# Patient Record
Sex: MALE | Race: WHITE
[De-identification: names, ages, dates, MRNs, and addresses within clinical notes are randomized per-mention and may not be internally consistent; named-entity substitution may affect disease eponyms.]

---

## 2018-10-14 ENCOUNTER — HOSPITAL ENCOUNTER (EMERGENCY)
Dept: HOSPITAL 53 - M ED | Age: 73
Discharge: HOME | End: 2018-10-14
Payer: COMMERCIAL

## 2018-10-14 DIAGNOSIS — M17.11: Primary | ICD-10-CM

## 2018-10-14 DIAGNOSIS — I10: ICD-10-CM

## 2018-10-14 DIAGNOSIS — Z79.899: ICD-10-CM

## 2018-10-14 DIAGNOSIS — Z79.82: ICD-10-CM

## 2018-10-14 DIAGNOSIS — E78.00: ICD-10-CM

## 2018-10-14 PROCEDURE — 73564 X-RAY EXAM KNEE 4 OR MORE: CPT

## 2020-05-29 ENCOUNTER — HOSPITAL ENCOUNTER (EMERGENCY)
Dept: HOSPITAL 53 - M ED | Age: 75
Discharge: HOME | End: 2020-05-29
Payer: COMMERCIAL

## 2020-05-29 VITALS — WEIGHT: 205.43 LBS | HEIGHT: 72 IN | BODY MASS INDEX: 27.82 KG/M2

## 2020-05-29 VITALS — SYSTOLIC BLOOD PRESSURE: 131 MMHG | DIASTOLIC BLOOD PRESSURE: 75 MMHG

## 2020-05-29 DIAGNOSIS — E78.5: ICD-10-CM

## 2020-05-29 DIAGNOSIS — K40.90: Primary | ICD-10-CM

## 2020-05-29 DIAGNOSIS — Z87.891: ICD-10-CM

## 2020-05-29 DIAGNOSIS — Z79.82: ICD-10-CM

## 2020-05-29 DIAGNOSIS — I10: ICD-10-CM

## 2020-05-29 DIAGNOSIS — Z79.899: ICD-10-CM

## 2020-05-29 LAB
ALBUMIN SERPL BCG-MCNC: 3.9 GM/DL (ref 3.2–5.2)
ALT SERPL W P-5'-P-CCNC: 37 U/L (ref 12–78)
APTT BLD: 23.7 SECONDS (ref 25–38.4)
BASOPHILS # BLD AUTO: 0 10^3/UL (ref 0–0.2)
BASOPHILS NFR BLD AUTO: 0.4 % (ref 0–1)
BILIRUB CONJ SERPL-MCNC: 0.2 MG/DL (ref 0–0.2)
BILIRUB SERPL-MCNC: 0.6 MG/DL (ref 0.2–1)
BUN SERPL-MCNC: 16 MG/DL (ref 7–18)
CALCIUM SERPL-MCNC: 8.7 MG/DL (ref 8.8–10.2)
CHLORIDE SERPL-SCNC: 108 MEQ/L (ref 98–107)
CK MB CFR.DF SERPL CALC: 0.97
CK MB SERPL-MCNC: 1.7 NG/ML (ref ?–3.6)
CK SERPL-CCNC: 176 U/L (ref 39–308)
CO2 SERPL-SCNC: 25 MEQ/L (ref 21–32)
CREAT SERPL-MCNC: 0.98 MG/DL (ref 0.7–1.3)
EOSINOPHIL # BLD AUTO: 0.2 10^3/UL (ref 0–0.5)
EOSINOPHIL NFR BLD AUTO: 2 % (ref 0–3)
GFR SERPL CREATININE-BSD FRML MDRD: > 60 ML/MIN/{1.73_M2} (ref 42–?)
GLUCOSE SERPL-MCNC: 118 MG/DL (ref 70–100)
HCT VFR BLD AUTO: 39.1 % (ref 42–52)
HGB BLD-MCNC: 13.1 G/DL (ref 13.5–17.5)
INR PPP: 1.01
LIPASE SERPL-CCNC: 64 U/L (ref 73–393)
LYMPHOCYTES # BLD AUTO: 2.1 10^3/UL (ref 1.5–5)
LYMPHOCYTES NFR BLD AUTO: 26.6 % (ref 24–44)
MCH RBC QN AUTO: 29.8 PG (ref 27–33)
MCHC RBC AUTO-ENTMCNC: 33.5 G/DL (ref 32–36.5)
MCV RBC AUTO: 88.9 FL (ref 80–96)
MONOCYTES # BLD AUTO: 0.7 10^3/UL (ref 0–0.8)
MONOCYTES NFR BLD AUTO: 9.1 % (ref 0–5)
NEUTROPHILS # BLD AUTO: 4.8 10^3/UL (ref 1.5–8.5)
NEUTROPHILS NFR BLD AUTO: 61.5 % (ref 36–66)
PLATELET # BLD AUTO: 227 10^3/UL (ref 150–450)
POTASSIUM SERPL-SCNC: 3.5 MEQ/L (ref 3.5–5.1)
PROT SERPL-MCNC: 7.4 GM/DL (ref 6.4–8.2)
PROTHROMBIN TIME: 13 SECONDS (ref 11.8–14)
RBC # BLD AUTO: 4.4 10^6/UL (ref 4.3–6.1)
SODIUM SERPL-SCNC: 141 MEQ/L (ref 136–145)
T4 FREE SERPL-MCNC: 1.17 NG/DL (ref 0.76–1.46)
TROPONIN I SERPL-MCNC: < 0.02 NG/ML (ref ?–0.1)
TSH SERPL DL<=0.005 MIU/L-ACNC: 2.64 UIU/ML (ref 0.36–3.74)
WBC # BLD AUTO: 7.8 10^3/UL (ref 4–10)

## 2020-05-29 PROCEDURE — 80076 HEPATIC FUNCTION PANEL: CPT

## 2020-05-29 PROCEDURE — 85025 COMPLETE CBC W/AUTO DIFF WBC: CPT

## 2020-05-29 PROCEDURE — 85730 THROMBOPLASTIN TIME PARTIAL: CPT

## 2020-05-29 PROCEDURE — 74177 CT ABD & PELVIS W/CONTRAST: CPT

## 2020-05-29 PROCEDURE — 85610 PROTHROMBIN TIME: CPT

## 2020-05-29 PROCEDURE — 71045 X-RAY EXAM CHEST 1 VIEW: CPT

## 2020-05-29 PROCEDURE — 83690 ASSAY OF LIPASE: CPT

## 2020-05-29 PROCEDURE — 84443 ASSAY THYROID STIM HORMONE: CPT

## 2020-05-29 PROCEDURE — 84439 ASSAY OF FREE THYROXINE: CPT

## 2020-05-29 PROCEDURE — 93005 ELECTROCARDIOGRAM TRACING: CPT

## 2020-05-29 PROCEDURE — 80047 BASIC METABLC PNL IONIZED CA: CPT

## 2020-05-29 PROCEDURE — 80048 BASIC METABOLIC PNL TOTAL CA: CPT

## 2020-05-29 PROCEDURE — 82550 ASSAY OF CK (CPK): CPT

## 2020-05-29 PROCEDURE — 83605 ASSAY OF LACTIC ACID: CPT

## 2020-05-29 PROCEDURE — 93041 RHYTHM ECG TRACING: CPT

## 2020-05-29 PROCEDURE — 99285 EMERGENCY DEPT VISIT HI MDM: CPT

## 2020-05-29 PROCEDURE — 82553 CREATINE MB FRACTION: CPT

## 2020-05-29 PROCEDURE — 84484 ASSAY OF TROPONIN QUANT: CPT

## 2020-05-29 PROCEDURE — 94760 N-INVAS EAR/PLS OXIMETRY 1: CPT

## 2020-05-29 NOTE — REP
CHEST, SINGLE VIEW:

 

Single view of the chest is performed.

 

No acute infiltrate is seen.  The heart is normal in size.  There is

calcification and tortuosity of the thoracic aorta.  Mild interstitial prominence

of the lung bases is likely chronic.

 

IMPRESSION:

 

No evidence of acute infiltrate.

 

 

Electronically Signed by

Be Hamilton MD 05/29/2020 10:24 P

## 2020-05-30 NOTE — REP
REASON:  Left lower quadrant pain.

 

PRIORS:  None.

 

CONTRAST:  100 mL Isovue-370.

 

There is an incidental calcifying granuloma in the right lower lobe. The lung

fields are otherwise unremarkable. There are no pleural or pericardial

effusions.

 

The liver, gallbladder, spleen, pancreas, adrenal glands, and kidneys are within

normal limits. The abdominal aorta and para-aortic regions are within normal

limits. The abdominal and intrapelvic bowel loops and their mesenteries are

within normal limits. There is no intra-abdominal or intrapelvic mass or

adenopathy. There is no free fluid or free air in the abdomen or pelvis.

 

There is increased adipose tissue in the inguinal canal bilaterally, left greater

than right. There is no bowel containing inguinal hernia, however, in the left

inguinal canal there is an oval-shaped 3.8 x 2.2 x 4.8-cm sized low density

structure having somewhat slightly higher than water density Hounsfield unit

readings. In addition, there is a left-sided urinary bladder ear which points

toward the left inguinal ring.

 

Bone window technique through the examination shows the osseous structures to be

within normal limits for the patient's age.

 

IMPRESSION:

 

1.  There is no bowel containing hernia, however, there is increased mesentery

seen in the inguinal canal bilaterally, but left greater than right. On the left,

there is a fluid collection within the inguinal canal as well. The left-sided

bladder ear is likely secondary to mesentery being pulled within the left

inguinal canal which would be consistent with a mesenteric hernia not bowel.

 

2.  No other acute abnormalities noted with findings as described above.

 

 

Electronically Signed by

Sebastien Chen DO 06/01/2020 09:54 A

## 2020-05-30 NOTE — ECGEPIP
[facility] [place] - ED

                                       

                                       Test Date:    2020

Pat Name:     ANAMARIA NIX             Department:   

Patient ID:   [id number]                 Room:         -

Gender:       Male                     Technician:   

:          1945               Requested By: EULOGIO RIGGS

Order Number: [id number]     Reading MD:   Eulogio Pelaez

                                 Measurements

Intervals                              Axis          

Rate:         67                       P:            17

CT:           168                      QRS:          8

QRSD:         94                       T:            -3

QT:           404                                    

QTc:          427                                    

                           Interpretive Statements

SINUS RHYTHM

Comparison tracing not on file

Electronically Signed on 2020 0:14:39 EDT by Eulogio Pelaez

## 2020-06-26 ENCOUNTER — HOSPITAL ENCOUNTER (OUTPATIENT)
Dept: HOSPITAL 53 - M SFHCPLAZ | Age: 75
End: 2020-06-26
Attending: FAMILY MEDICINE
Payer: MEDICARE

## 2020-06-26 DIAGNOSIS — Z13.1: ICD-10-CM

## 2020-06-26 DIAGNOSIS — Z79.899: ICD-10-CM

## 2020-06-26 DIAGNOSIS — D64.9: ICD-10-CM

## 2020-06-26 DIAGNOSIS — I10: ICD-10-CM

## 2020-06-26 DIAGNOSIS — E78.2: Primary | ICD-10-CM

## 2020-06-26 LAB
ALBUMIN SERPL BCG-MCNC: 4.2 GM/DL (ref 3.2–5.2)
ALT SERPL W P-5'-P-CCNC: 36 U/L (ref 12–78)
BILIRUB SERPL-MCNC: 0.9 MG/DL (ref 0.2–1)
BUN SERPL-MCNC: 15 MG/DL (ref 7–18)
CALCIUM SERPL-MCNC: 9.2 MG/DL (ref 8.8–10.2)
CHLORIDE SERPL-SCNC: 105 MEQ/L (ref 98–107)
CHOLEST SERPL-MCNC: 165 MG/DL (ref ?–200)
CHOLEST/HDLC SERPL: 5.32 {RATIO} (ref ?–5)
CO2 SERPL-SCNC: 27 MEQ/L (ref 21–32)
CREAT SERPL-MCNC: 1.04 MG/DL (ref 0.7–1.3)
EST. AVERAGE GLUCOSE BLD GHB EST-MCNC: 126 MG/DL (ref 60–110)
FERRITIN SERPL-MCNC: 223 NG/ML (ref 26–388)
GFR SERPL CREATININE-BSD FRML MDRD: > 60 ML/MIN/{1.73_M2} (ref 42–?)
GLUCOSE SERPL-MCNC: 110 MG/DL (ref 70–100)
HCT VFR BLD AUTO: 42.2 % (ref 42–52)
HDLC SERPL-MCNC: 31 MG/DL (ref 40–?)
HGB BLD-MCNC: 13.9 G/DL (ref 13.5–17.5)
IRON SATN MFR SERPL: 21.2 % (ref 19.7–50)
IRON SERPL-MCNC: 65 UG/DL (ref 65–175)
LDLC SERPL CALC-MCNC: 90 MG/DL (ref ?–100)
MCH RBC QN AUTO: 29.3 PG (ref 27–33)
MCHC RBC AUTO-ENTMCNC: 32.9 G/DL (ref 32–36.5)
MCV RBC AUTO: 88.8 FL (ref 80–96)
NONHDLC SERPL-MCNC: 134 MG/DL
PLATELET # BLD AUTO: 238 10^3/UL (ref 150–450)
POTASSIUM SERPL-SCNC: 3.7 MEQ/L (ref 3.5–5.1)
PROT SERPL-MCNC: 8 GM/DL (ref 6.4–8.2)
RBC # BLD AUTO: 4.75 10^6/UL (ref 4.3–6.1)
SODIUM SERPL-SCNC: 137 MEQ/L (ref 136–145)
TIBC SERPL-MCNC: 306 UG/DL (ref 250–450)
TRIGL SERPL-MCNC: 222 MG/DL (ref ?–150)
WBC # BLD AUTO: 6.5 10^3/UL (ref 4–10)

## 2020-07-19 ENCOUNTER — HOSPITAL ENCOUNTER (OUTPATIENT)
Dept: HOSPITAL 53 - M LABSMTC | Age: 75
End: 2020-07-19
Attending: ANESTHESIOLOGY
Payer: MEDICARE

## 2020-07-19 DIAGNOSIS — Z01.818: Primary | ICD-10-CM

## 2020-07-19 DIAGNOSIS — Z11.59: ICD-10-CM

## 2021-04-07 ENCOUNTER — HOSPITAL ENCOUNTER (OUTPATIENT)
Dept: HOSPITAL 53 - M SFHCPLAZ | Age: 76
End: 2021-04-07
Attending: FAMILY MEDICINE
Payer: MEDICARE

## 2021-04-07 DIAGNOSIS — I10: ICD-10-CM

## 2021-04-07 DIAGNOSIS — R73.03: Primary | ICD-10-CM

## 2021-04-07 LAB
BUN SERPL-MCNC: 18 MG/DL (ref 7–18)
CALCIUM SERPL-MCNC: 10.2 MG/DL (ref 8.8–10.2)
CHLORIDE SERPL-SCNC: 105 MEQ/L (ref 98–107)
CO2 SERPL-SCNC: 26 MEQ/L (ref 21–32)
CREAT SERPL-MCNC: 1.05 MG/DL (ref 0.7–1.3)
EST. AVERAGE GLUCOSE BLD GHB EST-MCNC: 111 MG/DL (ref 60–110)
GFR SERPL CREATININE-BSD FRML MDRD: > 60 ML/MIN/{1.73_M2} (ref 42–?)
GLUCOSE SERPL-MCNC: 108 MG/DL (ref 70–100)
POTASSIUM SERPL-SCNC: 3.9 MEQ/L (ref 3.5–5.1)
SODIUM SERPL-SCNC: 140 MEQ/L (ref 136–145)

## 2022-04-29 ENCOUNTER — HOSPITAL ENCOUNTER (OUTPATIENT)
Dept: HOSPITAL 53 - M PLALAB | Age: 77
End: 2022-04-29
Attending: FAMILY MEDICINE
Payer: MEDICARE

## 2022-04-29 DIAGNOSIS — E78.2: Primary | ICD-10-CM

## 2022-04-29 DIAGNOSIS — I10: ICD-10-CM

## 2022-04-29 LAB
ALBUMIN SERPL BCG-MCNC: 4.1 GM/DL (ref 3.2–5.2)
ALT SERPL W P-5'-P-CCNC: 46 U/L (ref 12–78)
BILIRUB SERPL-MCNC: 1 MG/DL (ref 0.2–1)
BUN SERPL-MCNC: 17 MG/DL (ref 7–18)
CALCIUM SERPL-MCNC: 9.4 MG/DL (ref 8.8–10.2)
CHLORIDE SERPL-SCNC: 107 MEQ/L (ref 98–107)
CHOLEST SERPL-MCNC: 161 MG/DL (ref ?–200)
CHOLEST/HDLC SERPL: 4.47 {RATIO} (ref ?–5)
CO2 SERPL-SCNC: 29 MEQ/L (ref 21–32)
CREAT SERPL-MCNC: 1.11 MG/DL (ref 0.7–1.3)
GFR SERPL CREATININE-BSD FRML MDRD: > 60 ML/MIN/{1.73_M2} (ref 42–?)
GLUCOSE SERPL-MCNC: 109 MG/DL (ref 70–100)
HDLC SERPL-MCNC: 36 MG/DL (ref 40–?)
LDLC SERPL CALC-MCNC: 95 MG/DL (ref ?–100)
NONHDLC SERPL-MCNC: 125 MG/DL
POTASSIUM SERPL-SCNC: 3.8 MEQ/L (ref 3.5–5.1)
PROT SERPL-MCNC: 7.5 GM/DL (ref 6.4–8.2)
SODIUM SERPL-SCNC: 141 MEQ/L (ref 136–145)
TRIGL SERPL-MCNC: 149 MG/DL (ref ?–150)

## 2023-01-02 ENCOUNTER — HOSPITAL ENCOUNTER (INPATIENT)
Dept: HOSPITAL 53 - M ED | Age: 78
LOS: 1 days | Discharge: HOME | DRG: 65 | End: 2023-01-03
Attending: FAMILY MEDICINE | Admitting: FAMILY MEDICINE
Payer: MEDICARE

## 2023-01-02 VITALS — DIASTOLIC BLOOD PRESSURE: 92 MMHG | SYSTOLIC BLOOD PRESSURE: 123 MMHG

## 2023-01-02 VITALS — SYSTOLIC BLOOD PRESSURE: 150 MMHG | DIASTOLIC BLOOD PRESSURE: 74 MMHG

## 2023-01-02 VITALS — WEIGHT: 185.39 LBS | HEIGHT: 71 IN | BODY MASS INDEX: 25.95 KG/M2

## 2023-01-02 DIAGNOSIS — I63.511: Primary | ICD-10-CM

## 2023-01-02 DIAGNOSIS — Z79.899: ICD-10-CM

## 2023-01-02 DIAGNOSIS — G93.40: ICD-10-CM

## 2023-01-02 DIAGNOSIS — Z79.82: ICD-10-CM

## 2023-01-02 DIAGNOSIS — Z20.822: ICD-10-CM

## 2023-01-02 DIAGNOSIS — E78.5: ICD-10-CM

## 2023-01-02 DIAGNOSIS — I10: ICD-10-CM

## 2023-01-02 LAB
ALBUMIN SERPL BCG-MCNC: 4 G/DL (ref 3.2–5.2)
ALP SERPL-CCNC: 83 U/L (ref 46–116)
ALT SERPL W P-5'-P-CCNC: 27 U/L (ref 7–40)
AST SERPL-CCNC: 27 U/L (ref ?–34)
BASOPHILS # BLD AUTO: 0 10^3/UL (ref 0–0.2)
BASOPHILS NFR BLD AUTO: 0.4 % (ref 0–1)
BILIRUB CONJ SERPL-MCNC: 0.2 MG/DL (ref ?–0.4)
BILIRUB SERPL-MCNC: 0.7 MG/DL (ref 0.3–1.2)
EOSINOPHIL # BLD AUTO: 0.1 10^3/UL (ref 0–0.5)
EOSINOPHIL NFR BLD AUTO: 1.2 % (ref 0–3)
HCT VFR BLD AUTO: 42.9 % (ref 42–52)
HGB BLD-MCNC: 14.3 G/DL (ref 13.5–17.5)
LYMPHOCYTES # BLD AUTO: 1.5 10^3/UL (ref 1.5–5)
LYMPHOCYTES NFR BLD AUTO: 21.2 % (ref 24–44)
MCH RBC QN AUTO: 29.4 PG (ref 27–33)
MCHC RBC AUTO-ENTMCNC: 33.3 G/DL (ref 32–36.5)
MCV RBC AUTO: 88.1 FL (ref 80–96)
MONOCYTES # BLD AUTO: 0.6 10^3/UL (ref 0–0.8)
MONOCYTES NFR BLD AUTO: 8.2 % (ref 2–8)
NEUTROPHILS # BLD AUTO: 4.9 10^3/UL (ref 1.5–8.5)
NEUTROPHILS NFR BLD AUTO: 68.6 % (ref 36–66)
PLATELET # BLD AUTO: 256 10^3/UL (ref 150–450)
PROT SERPL-MCNC: 7.4 G/DL (ref 5.7–8.2)
RBC # BLD AUTO: 4.87 10^6/UL (ref 4.3–6.1)
RSV RNA NPH QL NAA+PROBE: NEGATIVE
TSH SERPL DL<=0.005 MIU/L-ACNC: 1.77 UIU/ML (ref 0.55–4.78)
VIT B12 SERPL-MCNC: 332 PG/ML (ref 211–911)
WBC # BLD AUTO: 7.2 10^3/UL (ref 4–10)

## 2023-01-02 RX ADMIN — MULTIPLE VITAMINS W/ MINERALS TAB SCH TAB: TAB at 17:23

## 2023-01-02 RX ADMIN — DORZOLAMIDE HYDROCHLORIDE SCH DROP: 20 SOLUTION/ DROPS OPHTHALMIC at 20:27

## 2023-01-02 RX ADMIN — ENOXAPARIN SODIUM SCH MG: 40 INJECTION SUBCUTANEOUS at 17:24

## 2023-01-03 VITALS — DIASTOLIC BLOOD PRESSURE: 72 MMHG | SYSTOLIC BLOOD PRESSURE: 115 MMHG

## 2023-01-03 VITALS — SYSTOLIC BLOOD PRESSURE: 123 MMHG | DIASTOLIC BLOOD PRESSURE: 70 MMHG

## 2023-01-03 LAB
ALBUMIN SERPL BCG-MCNC: 3.6 G/DL (ref 3.2–5.2)
ALP SERPL-CCNC: 74 U/L (ref 46–116)
ALT SERPL W P-5'-P-CCNC: 27 U/L (ref 7–40)
AST SERPL-CCNC: 27 U/L (ref ?–34)
BILIRUB SERPL-MCNC: 0.6 MG/DL (ref 0.3–1.2)
BUN SERPL-MCNC: 20 MG/DL (ref 9–23)
CALCIUM SERPL-MCNC: 9.1 MG/DL (ref 8.3–10.6)
CHLORIDE SERPL-SCNC: 105 MMOL/L (ref 98–107)
CO2 SERPL-SCNC: 22 MMOL/L (ref 20–31)
CREAT SERPL-MCNC: 0.94 MG/DL (ref 0.7–1.3)
GFR SERPL CREATININE-BSD FRML MDRD: > 60 ML/MIN/{1.73_M2} (ref 42–?)
GLUCOSE SERPL-MCNC: 97 MG/DL (ref 74–106)
HCT VFR BLD AUTO: 38.7 % (ref 42–52)
HGB BLD-MCNC: 13.1 G/DL (ref 13.5–17.5)
MAGNESIUM SERPL-MCNC: 2.1 MG/DL (ref 1.8–2.4)
MCH RBC QN AUTO: 29.8 PG (ref 27–33)
MCHC RBC AUTO-ENTMCNC: 33.9 G/DL (ref 32–36.5)
MCV RBC AUTO: 88.2 FL (ref 80–96)
PLATELET # BLD AUTO: 232 10^3/UL (ref 150–450)
POTASSIUM SERPL-SCNC: 3.8 MMOL/L (ref 3.5–5.1)
PROT SERPL-MCNC: 6.6 G/DL (ref 5.7–8.2)
RBC # BLD AUTO: 4.39 10^6/UL (ref 4.3–6.1)
SODIUM SERPL-SCNC: 141 MMOL/L (ref 136–145)
WBC # BLD AUTO: 7.1 10^3/UL (ref 4–10)

## 2023-01-03 RX ADMIN — MULTIPLE VITAMINS W/ MINERALS TAB SCH TAB: TAB at 09:34

## 2023-01-03 RX ADMIN — ENOXAPARIN SODIUM SCH MG: 40 INJECTION SUBCUTANEOUS at 09:33

## 2023-01-03 RX ADMIN — DORZOLAMIDE HYDROCHLORIDE SCH DROP: 20 SOLUTION/ DROPS OPHTHALMIC at 09:33

## 2023-03-20 ENCOUNTER — HOSPITAL ENCOUNTER (OUTPATIENT)
Dept: HOSPITAL 53 - M PLALAB | Age: 78
End: 2023-03-20
Attending: PSYCHIATRY & NEUROLOGY
Payer: MEDICARE

## 2023-03-20 DIAGNOSIS — E11.9: Primary | ICD-10-CM

## 2023-03-20 LAB
BUN SERPL-MCNC: 14 MG/DL (ref 9–23)
CHOLEST SERPL-MCNC: 139 MG/DL (ref ?–200)
CHOLEST/HDLC SERPL: 3.18 {RATIO} (ref ?–5)
CREAT SERPL-MCNC: 0.96 MG/DL (ref 0.7–1.3)
EST. AVERAGE GLUCOSE BLD GHB EST-MCNC: 114 MG/DL (ref 60–110)
GFR SERPL CREATININE-BSD FRML MDRD: > 60 ML/MIN/{1.73_M2} (ref 42–?)
HDLC SERPL-MCNC: 43.6 MG/DL (ref 40–?)
LDLC SERPL CALC-MCNC: 75.2 MG/DL (ref ?–100)
NONHDLC SERPL-MCNC: 95.4 MG/DL
TRIGL SERPL-MCNC: 101 MG/DL (ref ?–150)

## 2023-03-28 ENCOUNTER — HOSPITAL ENCOUNTER (OUTPATIENT)
Dept: HOSPITAL 53 - M CARPUL | Age: 78
End: 2023-03-28
Attending: NURSE PRACTITIONER
Payer: MEDICARE

## 2023-03-28 DIAGNOSIS — I10: ICD-10-CM

## 2023-03-28 DIAGNOSIS — I35.0: ICD-10-CM

## 2023-03-28 DIAGNOSIS — G93.40: Primary | ICD-10-CM

## 2023-04-19 ENCOUNTER — HOSPITAL ENCOUNTER (OUTPATIENT)
Dept: HOSPITAL 53 - M RAD | Age: 78
End: 2023-04-19
Attending: NURSE PRACTITIONER
Payer: MEDICARE

## 2023-04-19 DIAGNOSIS — E07.9: Primary | ICD-10-CM

## 2023-05-18 ENCOUNTER — HOSPITAL ENCOUNTER (OUTPATIENT)
Dept: HOSPITAL 53 - M LAB REF | Age: 78
End: 2023-05-18
Attending: INTERNAL MEDICINE
Payer: MEDICARE

## 2023-05-18 DIAGNOSIS — E04.1: Primary | ICD-10-CM
